# Patient Record
Sex: FEMALE | Race: WHITE | NOT HISPANIC OR LATINO | Employment: UNEMPLOYED | URBAN - NONMETROPOLITAN AREA
[De-identification: names, ages, dates, MRNs, and addresses within clinical notes are randomized per-mention and may not be internally consistent; named-entity substitution may affect disease eponyms.]

---

## 2021-11-01 LAB
EXTERNAL HEPATITIS B SURFACE ANTIGEN: NEGATIVE
EXTERNAL RUBELLA QUALITATIVE: NORMAL
EXTERNAL SYPHILIS RPR SCREEN: NORMAL
HIV1 P24 AG SERPL QL IA: NORMAL

## 2022-03-03 LAB
EXTERNAL CHLAMYDIA SCREEN: NEGATIVE
EXTERNAL GONORRHEA SCREEN: NEGATIVE
EXTERNAL GROUP B STREP ANTIGEN: NEGATIVE

## 2022-03-11 ENCOUNTER — TRANSCRIBE ORDERS (OUTPATIENT)
Dept: ADMINISTRATIVE | Facility: HOSPITAL | Age: 22
End: 2022-03-11

## 2022-03-11 DIAGNOSIS — Z01.818 PRE-OPERATIVE CLEARANCE: Primary | ICD-10-CM

## 2022-03-21 ENCOUNTER — LAB (OUTPATIENT)
Dept: LAB | Facility: HOSPITAL | Age: 22
End: 2022-03-21

## 2022-03-21 DIAGNOSIS — Z01.818 PRE-OPERATIVE CLEARANCE: ICD-10-CM

## 2022-03-21 PROCEDURE — C9803 HOPD COVID-19 SPEC COLLECT: HCPCS

## 2022-03-21 PROCEDURE — U0004 COV-19 TEST NON-CDC HGH THRU: HCPCS | Performed by: OBSTETRICS & GYNECOLOGY

## 2022-03-22 LAB — SARS-COV-2 RNA PNL SPEC NAA+PROBE: NOT DETECTED

## 2022-03-23 ENCOUNTER — HOSPITAL ENCOUNTER (INPATIENT)
Facility: HOSPITAL | Age: 22
LOS: 2 days | Discharge: HOME | End: 2022-03-25
Attending: OBSTETRICS & GYNECOLOGY | Admitting: OBSTETRICS & GYNECOLOGY

## 2022-03-23 ENCOUNTER — ANESTHESIA EVENT (OUTPATIENT)
Dept: LABOR AND DELIVERY | Facility: HOSPITAL | Age: 22
End: 2022-03-23

## 2022-03-23 ENCOUNTER — ANESTHESIA (OUTPATIENT)
Dept: LABOR AND DELIVERY | Facility: HOSPITAL | Age: 22
End: 2022-03-23

## 2022-03-23 PROBLEM — Z34.90 PREGNANCY: Status: RESOLVED | Noted: 2022-03-23 | Resolved: 2022-03-23

## 2022-03-23 PROBLEM — Z34.90 PREGNANCY: Status: ACTIVE | Noted: 2022-03-23

## 2022-03-23 LAB
ABO GROUP BLD: NORMAL
ABO GROUP BLD: NORMAL
AMPHET+METHAMPHET UR QL: NEGATIVE
AMPHETAMINES UR QL: NEGATIVE
BARBITURATES UR QL SCN: NEGATIVE
BASOPHILS # BLD AUTO: 0.04 10*3/MM3 (ref 0–0.2)
BASOPHILS NFR BLD AUTO: 0.3 % (ref 0–1.5)
BENZODIAZ UR QL SCN: NEGATIVE
BLD GP AB SCN SERPL QL: NEGATIVE
BUPRENORPHINE SERPL-MCNC: NEGATIVE NG/ML
CANNABINOIDS SERPL QL: NEGATIVE
COCAINE UR QL: NEGATIVE
DEPRECATED RDW RBC AUTO: 40.5 FL (ref 37–54)
EOSINOPHIL # BLD AUTO: 0.15 10*3/MM3 (ref 0–0.4)
EOSINOPHIL NFR BLD AUTO: 1.2 % (ref 0.3–6.2)
ERYTHROCYTE [DISTWIDTH] IN BLOOD BY AUTOMATED COUNT: 13.2 % (ref 12.3–15.4)
HCT VFR BLD AUTO: 34.2 % (ref 34–46.6)
HGB BLD-MCNC: 11.8 G/DL (ref 12–15.9)
IMM GRANULOCYTES # BLD AUTO: 0.12 10*3/MM3 (ref 0–0.05)
IMM GRANULOCYTES NFR BLD AUTO: 1 % (ref 0–0.5)
LYMPHOCYTES # BLD AUTO: 3.09 10*3/MM3 (ref 0.7–3.1)
LYMPHOCYTES NFR BLD AUTO: 25.4 % (ref 19.6–45.3)
MCH RBC QN AUTO: 29.4 PG (ref 26.6–33)
MCHC RBC AUTO-ENTMCNC: 34.5 G/DL (ref 31.5–35.7)
MCV RBC AUTO: 85.3 FL (ref 79–97)
METHADONE UR QL SCN: NEGATIVE
MONOCYTES # BLD AUTO: 0.82 10*3/MM3 (ref 0.1–0.9)
MONOCYTES NFR BLD AUTO: 6.7 % (ref 5–12)
NEUTROPHILS NFR BLD AUTO: 65.4 % (ref 42.7–76)
NEUTROPHILS NFR BLD AUTO: 7.93 10*3/MM3 (ref 1.7–7)
NRBC BLD AUTO-RTO: 0 /100 WBC (ref 0–0.2)
OPIATES UR QL: NEGATIVE
OXYCODONE UR QL SCN: NEGATIVE
PCP UR QL SCN: NEGATIVE
PLATELET # BLD AUTO: 160 10*3/MM3 (ref 140–450)
PMV BLD AUTO: 10.9 FL (ref 6–12)
PROPOXYPH UR QL: NEGATIVE
RBC # BLD AUTO: 4.01 10*6/MM3 (ref 3.77–5.28)
RH BLD: POSITIVE
RH BLD: POSITIVE
T&S EXPIRATION DATE: NORMAL
TRICYCLICS UR QL SCN: NEGATIVE
WBC NRBC COR # BLD: 12.15 10*3/MM3 (ref 3.4–10.8)

## 2022-03-23 PROCEDURE — C1755 CATHETER, INTRASPINAL: HCPCS | Performed by: ANESTHESIOLOGY

## 2022-03-23 PROCEDURE — 25010000002 ROPIVACAINE PER 1 MG: Performed by: ANESTHESIOLOGY

## 2022-03-23 PROCEDURE — 0KQM0ZZ REPAIR PERINEUM MUSCLE, OPEN APPROACH: ICD-10-PCS | Performed by: OBSTETRICS & GYNECOLOGY

## 2022-03-23 PROCEDURE — C1755 CATHETER, INTRASPINAL: HCPCS

## 2022-03-23 PROCEDURE — 80306 DRUG TEST PRSMV INSTRMNT: CPT | Performed by: OBSTETRICS & GYNECOLOGY

## 2022-03-23 PROCEDURE — 59025 FETAL NON-STRESS TEST: CPT

## 2022-03-23 PROCEDURE — 86901 BLOOD TYPING SEROLOGIC RH(D): CPT | Performed by: OBSTETRICS & GYNECOLOGY

## 2022-03-23 PROCEDURE — 86901 BLOOD TYPING SEROLOGIC RH(D): CPT

## 2022-03-23 PROCEDURE — 86900 BLOOD TYPING SEROLOGIC ABO: CPT | Performed by: OBSTETRICS & GYNECOLOGY

## 2022-03-23 PROCEDURE — 86900 BLOOD TYPING SEROLOGIC ABO: CPT

## 2022-03-23 PROCEDURE — 86850 RBC ANTIBODY SCREEN: CPT | Performed by: OBSTETRICS & GYNECOLOGY

## 2022-03-23 PROCEDURE — 85025 COMPLETE CBC W/AUTO DIFF WBC: CPT | Performed by: OBSTETRICS & GYNECOLOGY

## 2022-03-23 RX ORDER — GLYCERIN/PROPYLENE GLYCOL/WATR
1 SOLUTION, NON-ORAL VAGINAL AS NEEDED
Status: DISCONTINUED | OUTPATIENT
Start: 2022-03-23 | End: 2022-03-24

## 2022-03-23 RX ORDER — FAMOTIDINE 10 MG/ML
20 INJECTION, SOLUTION INTRAVENOUS ONCE AS NEEDED
Status: COMPLETED | OUTPATIENT
Start: 2022-03-23 | End: 2022-03-23

## 2022-03-23 RX ORDER — MINERAL OIL
OIL (ML) MISCELLANEOUS ONCE
Status: DISCONTINUED | OUTPATIENT
Start: 2022-03-23 | End: 2022-03-24 | Stop reason: HOSPADM

## 2022-03-23 RX ORDER — IBUPROFEN 800 MG/1
800 TABLET ORAL EVERY 6 HOURS PRN
Status: DISCONTINUED | OUTPATIENT
Start: 2022-03-23 | End: 2022-03-24 | Stop reason: HOSPADM

## 2022-03-23 RX ORDER — OXYTOCIN-SODIUM CHLORIDE 0.9% IV SOLN 30 UNIT/500ML 30-0.9/5 UT/ML-%
999 SOLUTION INTRAVENOUS ONCE
Status: DISCONTINUED | OUTPATIENT
Start: 2022-03-23 | End: 2022-03-24 | Stop reason: HOSPADM

## 2022-03-23 RX ORDER — ONDANSETRON 2 MG/ML
4 INJECTION INTRAMUSCULAR; INTRAVENOUS ONCE AS NEEDED
Status: DISCONTINUED | OUTPATIENT
Start: 2022-03-23 | End: 2022-03-24 | Stop reason: HOSPADM

## 2022-03-23 RX ORDER — ONDANSETRON 4 MG/1
4 TABLET, FILM COATED ORAL EVERY 6 HOURS PRN
Status: DISCONTINUED | OUTPATIENT
Start: 2022-03-23 | End: 2022-03-24 | Stop reason: HOSPADM

## 2022-03-23 RX ORDER — OXYTOCIN-SODIUM CHLORIDE 0.9% IV SOLN 30 UNIT/500ML 30-0.9/5 UT/ML-%
125 SOLUTION INTRAVENOUS CONTINUOUS PRN
Status: DISCONTINUED | OUTPATIENT
Start: 2022-03-24 | End: 2022-03-24 | Stop reason: HOSPADM

## 2022-03-23 RX ORDER — BUPIVACAINE HYDROCHLORIDE 2.5 MG/ML
INJECTION, SOLUTION EPIDURAL; INFILTRATION; INTRACAUDAL
Status: COMPLETED
Start: 2022-03-23 | End: 2022-03-23

## 2022-03-23 RX ORDER — METHYLERGONOVINE MALEATE 0.2 MG/ML
200 INJECTION INTRAVENOUS ONCE AS NEEDED
Status: DISCONTINUED | OUTPATIENT
Start: 2022-03-23 | End: 2022-03-24 | Stop reason: HOSPADM

## 2022-03-23 RX ORDER — OXYTOCIN-SODIUM CHLORIDE 0.9% IV SOLN 30 UNIT/500ML 30-0.9/5 UT/ML-%
250 SOLUTION INTRAVENOUS CONTINUOUS
Status: ACTIVE | OUTPATIENT
Start: 2022-03-24 | End: 2022-03-24

## 2022-03-23 RX ORDER — ONDANSETRON 2 MG/ML
4 INJECTION INTRAMUSCULAR; INTRAVENOUS EVERY 6 HOURS PRN
Status: DISCONTINUED | OUTPATIENT
Start: 2022-03-23 | End: 2022-03-24 | Stop reason: HOSPADM

## 2022-03-23 RX ORDER — ROPIVACAINE HYDROCHLORIDE 2 MG/ML
14 INJECTION, SOLUTION EPIDURAL; INFILTRATION; PERINEURAL CONTINUOUS
Status: DISCONTINUED | OUTPATIENT
Start: 2022-03-23 | End: 2022-03-24

## 2022-03-23 RX ORDER — PRENATAL VIT/IRON FUM/FOLIC AC 27MG-0.8MG
1 TABLET ORAL DAILY
Status: DISCONTINUED | OUTPATIENT
Start: 2022-03-24 | End: 2022-03-24

## 2022-03-23 RX ORDER — CARBOPROST TROMETHAMINE 250 UG/ML
250 INJECTION, SOLUTION INTRAMUSCULAR AS NEEDED
Status: DISCONTINUED | OUTPATIENT
Start: 2022-03-23 | End: 2022-03-24 | Stop reason: HOSPADM

## 2022-03-23 RX ORDER — MISOPROSTOL 100 UG/1
600 TABLET ORAL AS NEEDED
Status: DISCONTINUED | OUTPATIENT
Start: 2022-03-23 | End: 2022-03-24 | Stop reason: HOSPADM

## 2022-03-23 RX ORDER — BUPIVACAINE HYDROCHLORIDE 2.5 MG/ML
INJECTION, SOLUTION EPIDURAL; INFILTRATION; INTRACAUDAL AS NEEDED
Status: DISCONTINUED | OUTPATIENT
Start: 2022-03-23 | End: 2022-03-23 | Stop reason: SURG

## 2022-03-23 RX ORDER — LIDOCAINE HYDROCHLORIDE 10 MG/ML
INJECTION, SOLUTION INFILTRATION; PERINEURAL
Status: DISCONTINUED
Start: 2022-03-23 | End: 2022-03-25 | Stop reason: HOSPADM

## 2022-03-23 RX ORDER — ACETAMINOPHEN 325 MG/1
650 TABLET ORAL EVERY 4 HOURS PRN
Status: DISCONTINUED | OUTPATIENT
Start: 2022-03-23 | End: 2022-03-24 | Stop reason: HOSPADM

## 2022-03-23 RX ORDER — SODIUM CHLORIDE 0.9 % (FLUSH) 0.9 %
3-10 SYRINGE (ML) INJECTION AS NEEDED
Status: DISCONTINUED | OUTPATIENT
Start: 2022-03-23 | End: 2022-03-24 | Stop reason: HOSPADM

## 2022-03-23 RX ORDER — OXYTOCIN-SODIUM CHLORIDE 0.9% IV SOLN 30 UNIT/500ML 30-0.9/5 UT/ML-%
2-20 SOLUTION INTRAVENOUS
Status: DISCONTINUED | OUTPATIENT
Start: 2022-03-23 | End: 2022-03-24 | Stop reason: HOSPADM

## 2022-03-23 RX ORDER — SODIUM CHLORIDE, SODIUM LACTATE, POTASSIUM CHLORIDE, CALCIUM CHLORIDE 600; 310; 30; 20 MG/100ML; MG/100ML; MG/100ML; MG/100ML
125 INJECTION, SOLUTION INTRAVENOUS CONTINUOUS
Status: DISCONTINUED | OUTPATIENT
Start: 2022-03-23 | End: 2022-03-24

## 2022-03-23 RX ORDER — BUTORPHANOL TARTRATE 1 MG/ML
1 INJECTION, SOLUTION INTRAMUSCULAR; INTRAVENOUS
Status: DISCONTINUED | OUTPATIENT
Start: 2022-03-23 | End: 2022-03-24 | Stop reason: HOSPADM

## 2022-03-23 RX ORDER — EPHEDRINE SULFATE 50 MG/ML
10 INJECTION, SOLUTION INTRAVENOUS
Status: DISCONTINUED | OUTPATIENT
Start: 2022-03-23 | End: 2022-03-24 | Stop reason: HOSPADM

## 2022-03-23 RX ORDER — PRENATAL VIT/IRON FUM/FOLIC AC 27MG-0.8MG
1 TABLET ORAL DAILY
COMMUNITY

## 2022-03-23 RX ADMIN — MISOPROSTOL 600 MCG: 100 TABLET ORAL at 23:03

## 2022-03-23 RX ADMIN — SODIUM CHLORIDE, POTASSIUM CHLORIDE, SODIUM LACTATE AND CALCIUM CHLORIDE 1000 ML: 600; 310; 30; 20 INJECTION, SOLUTION INTRAVENOUS at 10:55

## 2022-03-23 RX ADMIN — FAMOTIDINE 20 MG: 10 INJECTION INTRAVENOUS at 19:16

## 2022-03-23 RX ADMIN — BUPIVACAINE HYDROCHLORIDE 10 ML: 2.5 INJECTION, SOLUTION EPIDURAL; INFILTRATION; INTRACAUDAL; PERINEURAL at 11:12

## 2022-03-23 RX ADMIN — SODIUM CHLORIDE, POTASSIUM CHLORIDE, SODIUM LACTATE AND CALCIUM CHLORIDE 125 ML/HR: 600; 310; 30; 20 INJECTION, SOLUTION INTRAVENOUS at 19:16

## 2022-03-23 RX ADMIN — SODIUM CHLORIDE, POTASSIUM CHLORIDE, SODIUM LACTATE AND CALCIUM CHLORIDE 125 ML/HR: 600; 310; 30; 20 INJECTION, SOLUTION INTRAVENOUS at 11:16

## 2022-03-23 RX ADMIN — IBUPROFEN 800 MG: 800 TABLET, FILM COATED ORAL at 23:55

## 2022-03-23 RX ADMIN — SODIUM CHLORIDE, POTASSIUM CHLORIDE, SODIUM LACTATE AND CALCIUM CHLORIDE 125 ML/HR: 600; 310; 30; 20 INJECTION, SOLUTION INTRAVENOUS at 06:46

## 2022-03-23 RX ADMIN — Medication 1 APPLICATION: at 22:20

## 2022-03-23 RX ADMIN — ROPIVACAINE HYDROCHLORIDE 14 ML/HR: 2 INJECTION, SOLUTION EPIDURAL; INFILTRATION at 18:51

## 2022-03-23 RX ADMIN — OXYTOCIN 2 MILLI-UNITS/MIN: 10 INJECTION, SOLUTION INTRAMUSCULAR; INTRAVENOUS at 07:20

## 2022-03-23 NOTE — ANESTHESIA PREPROCEDURE EVALUATION
Anesthesia Evaluation     Patient summary reviewed and Nursing notes reviewed   no history of anesthetic complications:  NPO Solid Status: > 8 hours  NPO Liquid Status: > 8 hours           Airway   Mallampati: II  TM distance: >3 FB  Neck ROM: full  No difficulty expected  Dental - normal exam     Pulmonary - negative pulmonary ROS and normal exam   Cardiovascular - negative cardio ROS and normal exam        Neuro/Psych- negative ROS  GI/Hepatic/Renal/Endo - negative ROS     Musculoskeletal (-) negative ROS    Abdominal  - normal exam    Bowel sounds: normal.   Substance History - negative use     OB/GYN negative ob/gyn ROS         Other                        Anesthesia Plan    ASA 2     epidural       Anesthetic plan, all risks, benefits, and alternatives have been provided, discussed and informed consent has been obtained with: patient.        CODE STATUS:

## 2022-03-23 NOTE — ANESTHESIA PROCEDURE NOTES
Labor Epidural    Pre-sedation assessment completed: 3/23/2022 10:59 AM    Patient reassessed immediately prior to procedure    Patient location during procedure: OB  Start Time: 3/23/2022 10:59 AM  Stop Time: 3/23/2022 11:05 AM  Indication:at surgeon's request  Performed By  Anesthesiologist: Evin Hercules MD  Preanesthetic Checklist  Completed: patient identified, IV checked, site marked, risks and benefits discussed, surgical consent, monitors and equipment checked, pre-op evaluation and timeout performed  Prep:  Pt Position:sitting  Sterile Tech:gloves, mask, sterile barrier and cap  Prep:povidone-iodine 7.5% surgical scrub  Monitoring:blood pressure monitoring  Epidural Block Procedure:  Approach:midline  Guidance:landmark technique and palpation technique  Location:L3-L4  Needle Type:Tuohy  Needle Gauge:17 G  Loss of Resistance Medium: air  Loss of Resistance: 6cm  Cath Depth at skin:9 cm  Paresthesia: none  Aspiration:negative  Test Dose:negative  Number of Attempts: 1  Post Assessment:  Dressing:occlusive dressing applied, secured with tape and biopatch applied  Pt Tolerance:patient tolerated the procedure well with no apparent complications  Complications:no

## 2022-03-24 LAB
HCT VFR BLD AUTO: 33.2 % (ref 34–46.6)
HGB BLD-MCNC: 11.5 G/DL (ref 12–15.9)

## 2022-03-24 PROCEDURE — 85018 HEMOGLOBIN: CPT | Performed by: OBSTETRICS & GYNECOLOGY

## 2022-03-24 PROCEDURE — 85014 HEMATOCRIT: CPT | Performed by: OBSTETRICS & GYNECOLOGY

## 2022-03-24 RX ORDER — IBUPROFEN 800 MG/1
800 TABLET ORAL 3 TIMES DAILY
Status: DISCONTINUED | OUTPATIENT
Start: 2022-03-24 | End: 2022-03-25 | Stop reason: HOSPADM

## 2022-03-24 RX ORDER — BISACODYL 10 MG
10 SUPPOSITORY, RECTAL RECTAL DAILY PRN
Status: DISCONTINUED | OUTPATIENT
Start: 2022-03-24 | End: 2022-03-25 | Stop reason: HOSPADM

## 2022-03-24 RX ORDER — HYDROCORTISONE ACETATE PRAMOXINE HCL 1; 1 G/100G; G/100G
1 CREAM TOPICAL AS NEEDED
Status: DISCONTINUED | OUTPATIENT
Start: 2022-03-24 | End: 2022-03-25 | Stop reason: HOSPADM

## 2022-03-24 RX ORDER — HYDROCORTISONE 25 MG/G
1 CREAM TOPICAL AS NEEDED
Status: DISCONTINUED | OUTPATIENT
Start: 2022-03-24 | End: 2022-03-25 | Stop reason: HOSPADM

## 2022-03-24 RX ORDER — ONDANSETRON 4 MG/1
4 TABLET, FILM COATED ORAL EVERY 8 HOURS PRN
Status: DISCONTINUED | OUTPATIENT
Start: 2022-03-24 | End: 2022-03-25 | Stop reason: HOSPADM

## 2022-03-24 RX ORDER — SODIUM CHLORIDE 0.9 % (FLUSH) 0.9 %
1-10 SYRINGE (ML) INJECTION AS NEEDED
Status: DISCONTINUED | OUTPATIENT
Start: 2022-03-24 | End: 2022-03-25 | Stop reason: HOSPADM

## 2022-03-24 RX ORDER — HYDROCODONE BITARTRATE AND ACETAMINOPHEN 5; 325 MG/1; MG/1
1 TABLET ORAL EVERY 4 HOURS PRN
Status: DISCONTINUED | OUTPATIENT
Start: 2022-03-24 | End: 2022-03-25 | Stop reason: HOSPADM

## 2022-03-24 RX ORDER — DIPHENHYDRAMINE HCL 25 MG
25 CAPSULE ORAL NIGHTLY PRN
Status: DISCONTINUED | OUTPATIENT
Start: 2022-03-24 | End: 2022-03-25 | Stop reason: HOSPADM

## 2022-03-24 RX ORDER — METOCLOPRAMIDE 10 MG/1
10 TABLET ORAL ONCE
Status: DISCONTINUED | OUTPATIENT
Start: 2022-03-24 | End: 2022-03-25 | Stop reason: HOSPADM

## 2022-03-24 RX ORDER — DOCUSATE SODIUM 100 MG/1
100 CAPSULE, LIQUID FILLED ORAL 2 TIMES DAILY
Status: DISCONTINUED | OUTPATIENT
Start: 2022-03-24 | End: 2022-03-25 | Stop reason: HOSPADM

## 2022-03-24 RX ADMIN — IBUPROFEN 800 MG: 800 TABLET, FILM COATED ORAL at 06:05

## 2022-03-24 RX ADMIN — IBUPROFEN 800 MG: 800 TABLET, FILM COATED ORAL at 13:24

## 2022-03-24 RX ADMIN — IBUPROFEN 800 MG: 800 TABLET, FILM COATED ORAL at 21:18

## 2022-03-24 RX ADMIN — DOCUSATE SODIUM 100 MG: 100 CAPSULE ORAL at 08:32

## 2022-03-24 RX ADMIN — BENZOCAINE 1 APPLICATION: 5.6 OINTMENT TOPICAL at 06:05

## 2022-03-24 RX ADMIN — WITCH HAZEL 1 PAD: 500 SOLUTION RECTAL; TOPICAL at 06:05

## 2022-03-24 RX ADMIN — DOCUSATE SODIUM 100 MG: 100 CAPSULE ORAL at 21:18

## 2022-03-24 RX ADMIN — HYDROCORTISONE 2.5% 1 APPLICATION: 25 CREAM TOPICAL at 06:05

## 2022-03-25 ENCOUNTER — PATIENT OUTREACH (OUTPATIENT)
Dept: LABOR AND DELIVERY | Facility: HOSPITAL | Age: 22
End: 2022-03-25

## 2022-03-25 ENCOUNTER — REFERRAL TRIAGE (OUTPATIENT)
Dept: LABOR AND DELIVERY | Facility: HOSPITAL | Age: 22
End: 2022-03-25

## 2022-03-25 VITALS
HEIGHT: 60 IN | OXYGEN SATURATION: 99 % | BODY MASS INDEX: 31.61 KG/M2 | HEART RATE: 91 BPM | TEMPERATURE: 98 F | WEIGHT: 161 LBS | RESPIRATION RATE: 20 BRPM | DIASTOLIC BLOOD PRESSURE: 77 MMHG | SYSTOLIC BLOOD PRESSURE: 122 MMHG

## 2022-03-25 RX ORDER — IBUPROFEN 800 MG/1
800 TABLET ORAL EVERY 8 HOURS PRN
Qty: 40 TABLET | Refills: 1 | Status: SHIPPED | OUTPATIENT
Start: 2022-03-25

## 2022-03-25 RX ADMIN — DOCUSATE SODIUM 100 MG: 100 CAPSULE ORAL at 09:15

## 2022-03-25 RX ADMIN — IBUPROFEN 800 MG: 800 TABLET, FILM COATED ORAL at 05:06

## 2022-03-25 NOTE — PATIENT INSTRUCTIONS
[facility]'s Motherhood Connection    Being pregnant can be a joyful time in your life. It can also be a stressful experience, filled with questions and concerns about your and your baby's well-being. [facility] prenatal (pregnancy) providers understand. Whether you need routine care or specialized support during a high-risk pregnancy, we're here to help. Our healthcare team can care for you from the day your pregnancy test is positive until the moment you take your baby home from the hospital.    At your first prenatal appointments, your healthcare team will check on your health and your developing baby with routine exams and tests. They will also talk about the physical changes you are experiencing and answer your questions and concerns.    These appointments are a good time to ask your provider questions such as:    What foods should I make sure to eat while I'm pregnant?    Which prenatal vitamin is right for me?    Is it safe for me to travel right now?    What over-the-counter and prescription drugs are safe to use while I'm pregnant? Learn more about the risks involved with medicine use during pregnancy.    What do I need to know about sex, exercise, and sleeping during pregnancy?    What are the benefits of avoiding cigarettes, alcohol, and illegal drugs while I'm pregnant? Learn more about our smoking cessation classes and the risks involved with alcohol use during pregnancy.     Every day that you smoke less is an improvement!  Think about continuing to gradually reduce how much you smoke per day and consider how you can distract yourself when you think about smoking.    Quit for Two:  https://women.smokefree.gov/pregnancy-motherhood/quitting-while-pregnant/quit-for-two  Smoking during pregnancy (marchofdimes.org) https://www.marchofdimes.org/it-starts-with-mom/quitting-smoking-before-pregnancy.aspx  [facility]  https://www.quitnow[place].org/en-[place]/    Behavioral Health or Substance Use  "Disorders    [place]-Moms MATR (Maternal Assistance Towards Recovery) helps expectant [place] mothers who are at risk for using alcohol, tobacco and other drugs, to reduce harm to their children from their substance use, during and after pregnancy. This service is a collaboration between health departments, prenatal clinics and community mental health centers.  https://dbhdid.ky.gov/db/kymomsmatr.aspx#:~:text=[place]-Moms%20MATR%20%28Maternal%20Assistance%20Towards%20Recovery%29%20helps%20expectant,departments%2C%20prenatal%20clinics%20and%20community%20mental%20health%20centers      Substance Abuse and Mental Health Services Administration:  https://www.[facility].gov/  [facility]'s National Helpline, 7-507-202-HELP (4883), (also known as the Treatment Referral Routing Service) or TTY: 1-153.553.9434 is a confidential, free, 24-hour-a-day, 365-day-a-year, information service, in English and Russian, for individuals and family members facing mental and/or substance use disorders. This service provides referrals to local treatment facilities, support groups, and community-based organizations. Callers can also order free publications and other information.    [place] ([facility] free Maternity Chloé)   Evidence-based information and classes on prenatal care, labor & birth, postpartum, breastfeeding, and  care including lots of videos.  Information specific to your arrival and stay at [facility]  Kick counter, contraction timer, personal journal, feeding log, immunization log, and other tools  If you would like free access to our online education, please register below. You can also download the chloé by searching for \"MyHermilao\" in WebAction, Google Play and Windows.  On your smartphone or computer, go to [facility] in [place], [place]  Scroll down to bottom of page and select “MotherBaby” icon.  On next page, select “During Your Pregnancy”.  Scroll down page to “[facility]'s Free " Maternity Chloé”.  Click on PURPLE button to register.  Complete all fields and submit!  OR with your smartphones camera, scan the QR code below:    Or click this link [email or web address]    To check for [facility] benefits for qualified individuals and families, go to:   [facility] Resources  kynect  https://kynect.ky.[facility]/    Here is a list of some of the health plans available:    [place]:  Website:   [place] Medicaid (happn)  Phone number: 1-574.458.8033    Aetna:  Website:   [employer]666 VAB Flyer updated -6-29-21 ([place])  Phone Number: [phone]    Humana:  Website: [place] Medicaid: From Pregnancy to Parenthood - Humana    Phone Number: [phone]    Passport:  Website:  UesfIcdlkwsnifnDrnrolb3089Sooxeap_F ([employer])  Phone Number: 1-(686) [phone]  How to find a Pediatrician and Dentist:   Website:  Home (sapphirethreesixtyfive.com)    [facility] Care:  You can use this search tool for a primary care provider, dentist and a pediatrician: Provider Search Results  Find Care (M-DAQ)  Website:  [facility] Community [facility] Community Plan: Medicare & Medicaid Health Plans (uhccommunityplan.com)  Phone Number: [phone]    Wellcare:  Website:   Healthy Rewards Program (XP [employer]imentos)  Phone Number: 1-271.916.2049    SNAP Benefits (Food [place]):  The Supplemental Nutrition Assistance Program (SNAP) helps low-income people buy food for healthy meals at participating stores.  Supplemental Nutrition Assistance Program - Cabinet for Health and Family Services (ky.gov) https://kynect.ky.gov/benefits/s/snap-program?language=en_US    HANDS:  [facility] Nurturing Development Services, HANDS, supports families as they build healthy, safe environments for the optimal growth and development of children. HANDS is a home visiting program for  pregnant moms-to-be and new parents that supports all areas of your baby's development. HANDS will support you throughout your pregnancy and the first two years of your baby's life. From pregnancy to the “terrific”-twos, HANDS available to answer all of your questions during the different stages of your baby's growth. Enrollment MUST BE during pregnancy or when your baby is less than three months old. Contact your local health department for more information about HANDS. (http://www.Fullbridge/about/)  Website: Health Access Nurturing Development Services (Fullbridge)    Women, Infants and Children (WIC)  https://[facility].ky.gov/agencies/dph/dmch/nsb/Pages/wic.aspx  WIC is a national nutrition program that aims to protect the health of women, infants and children. You can find WIC programs in:  [facility]:  Website: [facility] [address] [address] (https://www.Innova/)  Phone number: [phone]    [facility].:  Website: [facility] [address] [address] (Innova)  Phone: [phone].    [facility]  Website: [facility] [address] [address] (https://wicprogram.net/)  Phone Number:  [phone]    [facility]  Website: [facility] [place] (Clinics & Food Programs) (https://www.[facility].org/)  Phone: [phone]    [facility]  Website: [facility] [address] [address] (https://www.Innova/)  Phone:  [phone]    [facility]  Website: [facility] Programs  [place] Programs in [facility]  Phone: [phone]    For more information on [facility], please visit the [place] Department for Public Health ([facility]).  If you have questions about the WIC program you can also call the [facility] toll-free at:  [phone]    Transportation  Assistance:  Non-Emergency Medicaid Transportation.  Medicaid covers rides for eligible individuals to and from the doctor's office, the hospital, or another medical office for Medicaid-approved care. This coverage is called “non-emergency medical transportation,” because it does not involve a medical emergency. Medicaid may give you a ride if you do not have a car that works or do not have a 's license. You may also be able to get a ride if you have a physical or mental disability or are unable to travel or wait for a ride alone. Coverage for these rides may be different depending on your individual situation and needs. You may need to get your State Medicaid agency's approval to qualify for a ride.  https://transportation.ky.gov/Pages/Home.aspx    Transportation for [facility] includes:   RTEC  HSTD Medicaid   RTEC (https://ridertec.org/) [facility]'s Motherhood Connection    Many moms-to-be feel a bit better from weeks 13 to 28 of their pregnancy. You may not be as tired or nauseated as you were during your first trimester. Your baby is doing a lot of growing during this time, though. You'll typically continue seeing your prenatal provider every four weeks. Most pregnant women feel energetic and healthy during this trimester    You'll undergo a few routine tests during this period of your pregnancy:  Rh testing: Your provider will test your blood's Rhesus (Rh) factor, which is an immune system-related protein most people have in their blood.    Oral glucose challenge test: Between your 24th and 28th weeks of pregnancy, you'll visit a lab and drink a sweet liquid (called Glucola) to test for gestational diabetes. The test takes about an hour. Gestational diabetes is a unique type of blood sugar condition that only develops when you're pregnant.     Ultrasound: This safe test uses sound waves to create images of your baby. It can help detect some medical conditions your baby might be developing,  such as cleft lip/cleft palate. However, it can also be a very exciting test because your ultrasound technician may be able to tell you your baby's gender if you want to know.     If 4-D ultrasound is available, you may be able to see your baby moving or sucking his or her thumb.     Amniocentesis: This test gives your doctor or midwife more detailed information about your baby's health. However, not every expectant mom needs this test, which involves drawing a sample of the amniotic fluid surrounding your baby. Your provider might recommend this test if you:    Had concerning screening test results earlier in your pregnancy.    Have been pregnant before with a baby who had medical issues.    Are age 35 or older.    Call your healthcare provider or go to the nearest emergency room if you experience any of these symptoms:  Intense cramping or abdominal pain   A significant decrease in your baby's movement after week 28 (fewer than about 6 to 10 movements in an hour).  Shortness of breath or trouble breathing.  Tightening or pain in your back or lower abdomen that could be contractions.  Feeling of intense pressure in your pelvis or vagina.  Any vaginal bleeding or leaking fluid.    Things to start thinking of:  Feeding plan for your   Choosing a Pediatrician for your [place]    Breastfeeding:   Insurance will pay for one breast pump per pregnancy. You can check with your insurance for coverage limits and to order through their preferred company or you can use any of the following websites:     The web sites listed can be used to order a breast pump:    https://CinelanastAppiny.RelayFoods/    https://www.Maui [employer] Company.PlaceVine    https://www.ZAPITANO.RelayFoods/    Information and support for breastfeeding can be found here:    La Leche League International:  https://www.llli.org/    WIC Breastfeeding Support: https://wicbreastfeeding.fns.usda.gov/    [facility] [place] [facility] Health and Family Services:  https://[facility].ky.gov/agencies/dph/dmch/nsb/Pages/breastfeeding.aspx    March of Dimes: https://www.marchPerpetuuiti TechnoSoft Services.org/baby/breastfeeding-your-baby     Department of Health & Human Services: https://www.womenshealth.gov/breastfeeding/learning-breastfeed/finding-breastfeeding-support-and-information    [facility] Lactation Consultant  If you have questions or concerns about breastfeeding, you can contact  CELESTINO Durand, [facility]  Women & Children's Educator  [facility]  [phone]    Formula feeding    For formula feeding, you can find useful information on these websites:    March of Dimes: https://www.marchArctic Wolf Networksmes.org/baby/feeding-your-baby-formula.aspx    Center for Disease Control and Prevention: https://www.cdc.gov/nutrition/infantandtoddlernutrition/formula-feeding/index.htm    Kids Health Organization: https://kidshealth.org/en/parents/formulafeed-starting.html    MothersBabies: https://www.mothersbabies.org/baby/?gclid=EXPtSPmwMiYU9ygzkEqt1VJHgU-tBh0uzgKiEAAYAiAAEgL5OfD_BwE [facility]'s Motherhood Connection      You are getting close to meeting your baby! Weeks 28 to 40 of your pregnancy are the final of your three trimesters. You will see your doctor or midwife every two to three weeks until your last month of pregnancy. At that point, you will have weekly appointments until your baby is born.     In most cases, your labor will start on its own somewhere after week 37. [facility] follows the American College of Obstetricians and Gynecologists' recommendation to let labor progress on its own. Moms who are not induced (which means having labor brought on with medication and other techniques) tend to have fewer health complications.    Tests you undergo now are to make sure you and your baby are ready for a healthy delivery. They include:     Group B streptococcus screening: Your provider will swab your vagina and rectum to check for the presence of this common bacterium. If you test  positive, your provider will give you antibiotics during labor and delivery to help prevent your baby from getting sick. Learn more about group B strep.     Electronic fetal heart monitoring: We can use a stethoscope or special electronic sensors on your belly to check your baby's heart rate.     Important things to do during your third trimester:  Finalize your feeding plan for your infant.    Discuss birth control options for use after delivery with your doctor.    Discuss anesthesia or pain management plans for use during labor and delivery with your doctor.      Take classes:   All classes are free but registration is required. [place] here:    Breastfeeding basics: This course will cover everything you need to know about breastfeeding.  [email or web address]/Graphene [email or web address]    Prenatal class: This class is designed to prepare you for your labor and delivery.  [email or web address] [email or web address]    Take a Labor & Delivery tour. Call our Educator at [phone] to schedule a tour.    Preregister at your hospital: Trust us -- you will not be in the mood to fill out paperwork and find your insurance card when you are in active labor.   To pre-register at [facility], call [phone].    Choose your baby's car seat. This is a good time to become familiar with how to install the car seat into your vehicle and how to use the car seat correctly. Every state requires your baby to sit in a rear-facing infant car seat in the back seat of your vehicle when you leave the hospital.      Choose your baby's doctor: We encourage you to select a medical provider for your baby sometime during your third trimester. Your [facility] team will need that doctor's contact information shortly after your baby is delivered. This helps us with the paperwork and approvals we need to complete your 's required tests and  immunizations.    Pack for your hospital stay:   Below are suggestions about what to bring to the hospital for your delivery:  Personal comfort items such as your own pillow, music, hard candy and lip balm.  Camera with fully charged battery and adapter.  Cell phone and .  Personal grooming items.  Comfortable robe and slippers.  Loose-fitting clothing for the ride home.  Several outfits for the baby.  Baby car seat.  Baby blankets.  Extra room in your suitcase or an extra bag to take home any baby gifts.  Health insurance card.  All medications you take in original containers.  Pediatrician name and contact information.  Sanitary pads.  Personal undergarments such as nursing bra.    If possible, take a vacation before baby arrives:  Go on a “babymoon” if you can. A short trip can be a nice opportunity for you and your partner to rest and spend uninterrupted time together before your little one arrives. Be sure to talk in advance to your healthcare provider, in case he or she suggests any travel restrictions.        How Long Do You Stay in the Hospital after Giving Birth?  It is normal for mothers to want to return to the comfort of their own home with their  as soon as possible. The duration of the mother and baby's hospital stay is dependent on several factors, including which procedure is used for delivery. Mothers, who give birth vaginally, without complications, will typically have a shorter stay than mothers who have a  delivery.     In the case of an uncomplicated vaginal delivery, a mother and her  can go home from the hospital after 24-48hrs. Staying at least 24 hours in the hospital allows for the mother to rest and for any effects from the anesthesia to wear off. Additionally, after labor and delivery, a doctor will want to monitor the mother and baby's health for the first day to make sure no problems arise.     Women who have delivered by  without any complications  are recommended to stay in the hospital for 2-4 days. If there are complications, the hospital stay may need to be longer. As part of delivery care after a  section, the mother will not be able to be released from the hospital until she is able to do the following: Urinate without a catheter, walk to the bathroom, pass gas, eat and drink without vomiting. [facility]’s Motherhood Connection    After Your Delivery: The [facility] Fabian Difference    After you have your baby, we continue to be with you and your  until you are both healthy enough and ready to go home. Our caring staff will assist you in every way they can.    You are undoubtedly excited to get to know your baby now that he or she is here. As long as mom and baby are healthy, you can likely spend the first hours of your baby’s life bonding. At some point in the hours after birth, our  care team will perform several routine checks of baby’s health.     We encourage you to spend as much time as possible holding your baby. One of the best ways to bond with your little one is skin-to-skin contact. The simple act of laying baby against your chest (called “kangaroo care”) offers health benefits for both of you. Learn more about why [facility] supports kangaroo care and why it is helpful for babies and new parents.    Our nurses work with moms and babies all day long. Many of them have taken extra classes on obstetrics and  care. They are well prepared to help you and your baby make the transition to life after pregnancy. Our neonatologist ( specialists) works closely with local pediatricians to ensure a seamless care transition after your  heads home.    “Couplet care”: Keeping mom and baby close helps to ensure the health of both. That is why we try to keep you and your  together. While we encourage keeping your baby in your room, we are happy to care for baby in our nursery if you need some  shut-eye.     Tests and Procedures    As long as mom and baby are healthy, you can spend the first hours of your baby’s life bonding. At some point in the hours after birth, our care team will perform several routine checks of baby’s health.     We will also do a few minor health procedures, such as:     Measurement check: We measure your baby’s head-to-foot length and head circumference. We also weigh him or her. These figures can give us a quick, overall snapshot of your baby’s health. Learn more about  measurements.     Physical examination: We check that your baby’s temperature, heart rate, blood pressure and other vital signs are all within normal range. We also examine your baby’s major body parts and general appearance. Learn more about physical exams of newborns.     Gestational age assessment: We may do this test to see how long baby actually developed in your uterus before birth -- that is your baby’s gestational age. This test is helpful if your baby is smaller or larger than we expected. It helps us double-check your baby’s physical development and determine whether he or she needs any extra care. Learn more about gestational age assessments. Health procedures: Most babies need a few simple medical interventions after birth, including:     Vitamin K injection: Newborns typically have low levels of vitamin K in their bodies and need more to help their blood clot normally.     Eye medicine: We apply antibiotic eye drops or ointments to  babies’ eyes. This medicine helps kill bacteria that may have built up in their eyes during delivery.     Vaccine shot: We can give your baby the first of three doses of the hepatitis B virus (HBV) vaccine right after birth. Otherwise, you can choose to have your pediatrician administer this shot during your baby’s first visit.     Hearing test: We may do a painless test of your baby’s hearing sometime before he or she leaves the hospital. Learn more  about hearing screening tests for newborns.     CCHD test: The Critical Congenital Heart Disease test is required by the state to screen for potentially life-threatening heart defects in newborns.    Postpartum Support     Private, comfortable rooms:  We have 13 private postpartum rooms. All rooms are fitted with a TV and DVD player, should you wish to watch your favorite movie. A sleeper sofa keeps your support partner comfortable during late  nights.     Lactation support: Our certified lactation consultant helps moms who wish to breastfeed get off to great feeding routines. We also offer a monthly breastfeeding class (free to the public), as well as outpatient assistance for moms who encounter hurdles after they head home.     Advanced  care: Specialized  care is only steps away, should your baby require extra attention shortly after birth. Our Level II  intensive care unit (NICU) is always ready to provide emergency care for premature infants or babies needing a higher level of support.      Postpartum services: Visitors may eliud over tiny feet, but we are still focused on your health. Our nurses will make sure that mom is getting the rest she needs to recover.    The first few weeks after you have your baby are called your postpartum period. During this time, it is not uncommon to feel tired, sometimes overwhelmed and just plain uncomfortable with the changes occurring in your body as it recovers. During your hospital stay, we will regularly check your temperature, heart rate and blood pressure. We will also check your uterus after either a vaginal birth or .     As soon as we confirm you are stable, you can probably get out of bed and move around. You can even take a shower if you would like. It is important to have a nurse, partner or other person standing by while you shower until you feel comfortable on your own.    Do not hesitate to tell us what else you need to feel  comfortable. If you are in pain, hungry, or need help with breastfeeding, let us know. We are here to help. For instance, you may need extra help with breastfeeding and we can refer you to a lactation specialist.    Getting Ready to Go Home    We will work with you to make your transition home from the hospital as smooth as possible. We will do a few last-minute health checks on you and your baby. We will also give you some tips on caring for your baby at home.    Before you go home, you will also need to:     Fill out baby’s birth certificate: Even if you have not yet decided on your baby’s name, you will need to complete this form. We will provide the paperwork.      If you would like [facility] to request a Social Security Number for your child, please indicate this by checking YES on the Birth Certificate Worksheet in the appropriate box.. If you do not receive your baby’s Social Security card after 8 weeks please call your local social security office.     Make an appointment with baby’s primary care provider: The American Academy of Pediatrics (AAP) recommends a checkup for your baby between two and five days after birth. We also need your doctor’s contact information while your baby is still in our hospital, in order to complete required tests and immunizations.      Prepare your car seat: Every state requires your baby to sit in a rear-facing infant car seat in the back seat of your vehicle when you leave the hospital.       The nurses at [facility] will offer instructions on how to care for yourself and your new baby at home. An appointment to see your doctor or nurse practitioner between two and six weeks after you deliver will be made for you before you leave the hospital. That is a good time to talk about birth control and other post-birth issues. Do not hesitate to make an appointment with your provider even before that follow-up appointment if you feel you need to be seen sooner. Be  aware of urgent maternal warning signs that would indicate you need to be seen by a health care provider immediately.     It is normal to feel emotional in the weeks after having a baby. However, if your “baby blues” last more than a few days, or you are thinking of hurting yourself or your baby, call your [facility] provider or 911 right away. Postpartum depression (PPD) can be a very serious condition, and our specialists can advise on treatment options.     Postpartum depression is a serious mental illness that involves the brain and affects your behavior and physical health. If you have depression, then sad, flat, or empty feelings do not go away and can interfere with your day-to-day life. You might feel unconnected to your baby, as if you are not the baby’s mother, or you might not love or care for the baby. These feelings can be mild to severe.”    https://[email or web address].gov/mental-health/mental-health-conditions/postpartum-depression    For more information on behavioral health services at [facility], please call: For outpatient treatment:    [facility]: [phone]

## 2022-03-25 NOTE — OUTREACH NOTE
Intake    Questions/Answers    Flowsheet Row Responses   Best Method for Contacting Home   Do you have a dentist? No   Resources Presently Utilizing: WIC (Women, Infant, Children)   Maternal Warning Signs Provided   Other: Provided        Motherhood Connection  IP Postpartum    Questions/Answers    Flowsheet Row Responses   Best Method for Contacting Home   Support Person Present Yes   Does the patient have a car seat at the hospital Yes   Delivery Not Reviewed Reviewed   Were birth expectations met? Yes   Is there a need for additional support/resources? No   Lactation Note Reviewed Reviewed   Is additional support needed? No   Any questions or concerns? No   Is the patient going to use Meds to Beds? Yes   Any concerns related discharge meds/ability to  prescriptions? No   OB Discharge Navigator Reviewed  Reviewed   Confirm Postpartum OB appointment Yes   Postpartum OB appointment date 22   Confirm initial well-child Pediatrician appointment date/time: No  [Not available at this time]   Additional post-discharge F/U appointments No   Does patient have transportation to appointments? Yes   Any other assistance needed to ensure she is able to attend appointments? No   Does patient have supplies needed at home for  care? Clothing, Crib, Diapers, Formula          Spoke with patient at bedside. Motherhood Connection handout given along with flyer for Urgent Maternal Warning signs. Pt denies any needs or concerns at this time.    Meenakshi Adam RN  Maternity Nurse Navigator    3/25/2022, 10:55 EDT      Enrollment    Questions/Answers    Flowsheet Row Responses   Would like to participate? Yes

## 2022-03-30 ENCOUNTER — PATIENT OUTREACH (OUTPATIENT)
Dept: LABOR AND DELIVERY | Facility: HOSPITAL | Age: 22
End: 2022-03-30

## 2022-03-30 NOTE — OUTREACH NOTE
Motherhood Connection  Unable to Reach         UTR, first attempt.    Meenakshi Adam RN  Maternity Nurse Navigator    3/30/2022, 14:26 EDT

## 2022-03-31 ENCOUNTER — PATIENT OUTREACH (OUTPATIENT)
Dept: LABOR AND DELIVERY | Facility: HOSPITAL | Age: 22
End: 2022-03-31

## 2022-03-31 NOTE — OUTREACH NOTE
Motherhood Connection  Unable to Reach       Questions/Answers    Flowsheet Row Responses   Pending Outreach Postpartum Check-in   Call Attempt Second   Outcome Left message          UTR, second attempt.    Meenakshi Adam RN  Maternity Nurse Navigator    3/31/2022, 15:03 EDT

## 2024-08-22 ENCOUNTER — TRANSCRIBE ORDERS (OUTPATIENT)
Dept: ADMINISTRATIVE | Facility: HOSPITAL | Age: 24
End: 2024-08-22
Payer: COMMERCIAL

## 2024-08-22 ENCOUNTER — HOSPITAL ENCOUNTER (OUTPATIENT)
Dept: GENERAL RADIOLOGY | Facility: HOSPITAL | Age: 24
Discharge: HOME | End: 2024-08-22
Payer: COMMERCIAL

## 2024-08-22 DIAGNOSIS — M25.539 PAIN OF WRIST AFTER TRAUMA: Primary | ICD-10-CM

## 2024-08-22 DIAGNOSIS — M25.539 PAIN OF WRIST AFTER TRAUMA: ICD-10-CM

## 2024-08-22 PROCEDURE — 73110 X-RAY EXAM OF WRIST: CPT
